# Patient Record
Sex: FEMALE | Race: WHITE | NOT HISPANIC OR LATINO | Employment: STUDENT | URBAN - METROPOLITAN AREA
[De-identification: names, ages, dates, MRNs, and addresses within clinical notes are randomized per-mention and may not be internally consistent; named-entity substitution may affect disease eponyms.]

---
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
## 2025-01-07 ENCOUNTER — HOSPITAL ENCOUNTER (EMERGENCY)
Facility: HOSPITAL | Age: 20
Discharge: HOME | End: 2025-01-07
Attending: EMERGENCY MEDICINE
Payer: MEDICAID

## 2025-01-07 VITALS
TEMPERATURE: 98 F | WEIGHT: 110 LBS | RESPIRATION RATE: 16 BRPM | OXYGEN SATURATION: 100 % | HEIGHT: 62 IN | BODY MASS INDEX: 20.24 KG/M2 | HEART RATE: 84 BPM | SYSTOLIC BLOOD PRESSURE: 100 MMHG | DIASTOLIC BLOOD PRESSURE: 70 MMHG

## 2025-01-07 DIAGNOSIS — N39.0 URINARY TRACT INFECTION WITHOUT HEMATURIA, SITE UNSPECIFIED: ICD-10-CM

## 2025-01-07 DIAGNOSIS — O20.9 VAGINAL BLEEDING AFFECTING EARLY PREGNANCY: Primary | ICD-10-CM

## 2025-01-07 DIAGNOSIS — O20.9 VAGINAL BLEEDING IN PREGNANCY, FIRST TRIMESTER: ICD-10-CM

## 2025-01-07 LAB
ALBUMIN SERPL-MCNC: 3.3 G/DL (ref 3.3–5.5)
ALP SERPL-CCNC: 41 U/L (ref 42–141)
B-HCG UR QL: POSITIVE
BILIRUB SERPL-MCNC: 0.8 MG/DL (ref 0.2–1.6)
BILIRUBIN, POC UA: NEGATIVE
BLOOD, POC UA: ABNORMAL
BUN SERPL-MCNC: 5 MG/DL (ref 7–22)
CALCIUM SERPL-MCNC: 9.5 MG/DL (ref 8–10.3)
CHLORIDE SERPL-SCNC: 108 MMOL/L (ref 98–108)
CLARITY, UA: ABNORMAL
COLOR, UA: YELLOW
CREAT SERPL-MCNC: 0.3 MG/DL (ref 0.6–1.2)
CTP QC/QA: YES
GLUCOSE SERPL-MCNC: 88 MG/DL (ref 73–118)
GLUCOSE, POC UA: NEGATIVE
HCG INTACT+B SERPL-ACNC: NORMAL MIU/ML
HCT, POC: NORMAL
HGB, POC: NORMAL (ref 14–18)
KETONES, POC UA: NEGATIVE
LEUKOCYTE EST, POC UA: ABNORMAL
MCH, POC: NORMAL
MCHC, POC: NORMAL
MCV, POC: NORMAL
MPV, POC: NORMAL
NITRITE, POC UA: NEGATIVE
PH UR STRIP: 7.5 [PH] (ref 5–8)
POC ALT (SGPT): 9 U/L (ref 10–47)
POC AST (SGOT): 17 U/L (ref 11–38)
POC PLATELET COUNT: NORMAL
POC TCO2: 25 MMOL/L (ref 18–33)
POTASSIUM BLD-SCNC: 4.2 MMOL/L (ref 3.6–5.1)
PROTEIN, POC UA: ABNORMAL
PROTEIN, POC: 6.9 G/DL (ref 6.4–8.1)
RBC, POC: NORMAL
RDW, POC: NORMAL
SODIUM BLD-SCNC: 144 MMOL/L (ref 128–145)
SPECIFIC GRAVITY, POC UA: 1.02 (ref 1–1.03)
UROBILINOGEN, POC UA: 0.2 E.U./DL
WBC, POC: NORMAL

## 2025-01-07 PROCEDURE — 81025 URINE PREGNANCY TEST: CPT | Mod: ER

## 2025-01-07 PROCEDURE — 25000003 PHARM REV CODE 250: Mod: ER | Performed by: EMERGENCY MEDICINE

## 2025-01-07 PROCEDURE — 96360 HYDRATION IV INFUSION INIT: CPT | Mod: ER

## 2025-01-07 PROCEDURE — 96372 THER/PROPH/DIAG INJ SC/IM: CPT | Performed by: EMERGENCY MEDICINE

## 2025-01-07 PROCEDURE — 80053 COMPREHEN METABOLIC PANEL: CPT | Mod: ER

## 2025-01-07 PROCEDURE — 99284 EMERGENCY DEPT VISIT MOD MDM: CPT | Mod: 25,ER

## 2025-01-07 PROCEDURE — 63600519 RHOGAM PHARM REV CODE 636 ALT 250 W HCPCS: Mod: ER | Performed by: EMERGENCY MEDICINE

## 2025-01-07 PROCEDURE — 85025 COMPLETE CBC W/AUTO DIFF WBC: CPT | Mod: ER

## 2025-01-07 PROCEDURE — 84702 CHORIONIC GONADOTROPIN TEST: CPT | Performed by: EMERGENCY MEDICINE

## 2025-01-07 RX ORDER — AMOXICILLIN 500 MG/1
500 CAPSULE ORAL 3 TIMES DAILY
Qty: 21 CAPSULE | Refills: 0 | Status: SHIPPED | OUTPATIENT
Start: 2025-01-07 | End: 2025-01-14

## 2025-01-07 RX ADMIN — SODIUM CHLORIDE 1000 ML: 9 INJECTION, SOLUTION INTRAVENOUS at 10:01

## 2025-01-07 RX ADMIN — HUMAN RHO(D) IMMUNE GLOBULIN 300 MCG: 300 INJECTION, SOLUTION INTRAMUSCULAR at 12:01

## 2025-01-07 NOTE — DISCHARGE INSTRUCTIONS
Take antibiotics for UTI. Do not insert anything in your vagina, including no sex until you are cleared by your OB doctor. Contact your doctor today about your ER visit and vaginal bleeding. If you have worsening symptoms, return to ER.

## 2025-01-07 NOTE — ED PROVIDER NOTES
"Encounter Date: 2025       History     Chief Complaint   Patient presents with    Vaginal Bleeding     . 11w pregnant. Noticed blood on toilet paper this am. Denies clots.      19-year-old female  at approximately 11 weeks gestation presents with vaginal bleeding that began this morning.  She states she had lower abdominal cramping a few days ago.  This morning when she woke she noted blood on the toilet paper.  The bleeding does remind her of a period.  No other complaints.  She does have established care with an OBGYN but has not contacted her doctor about this issue.  Per EMRs, she is O negative.      Review of patient's allergies indicates:  No Known Allergies  History reviewed. No pertinent past medical history.  Past Surgical History:   Procedure Laterality Date    COLONOSCOPY N/A 2020    Procedure: COLONOSCOPY;  Surgeon: Bhavesh Russo MD;  Location: [address];  Service: Endoscopy;  Laterality: N/A;     Family History   Problem Relation Name Age of Onset    GI problems Mother          IBS, 6 "colon polyps" as young child    GI problems Sister          diarrhea, abd pain     Social History     Tobacco Use    Smoking status: Never     Passive exposure: Never    Smokeless tobacco: Current   Substance Use Topics    Alcohol use: Yes     Comment: occ    Drug use: Never     Review of Systems   Constitutional:  Negative for activity change, appetite change, chills and fever.   HENT:  Negative for congestion, rhinorrhea, sneezing and sore throat.    Respiratory:  Negative for cough, choking, shortness of breath and wheezing.    Cardiovascular:  Negative for chest pain and palpitations.   Gastrointestinal:  Negative for abdominal pain, diarrhea, nausea and vomiting.   Genitourinary:  Positive for vaginal bleeding.   Neurological:  Negative for dizziness, syncope, light-headedness and headaches.   All other systems reviewed and are negative.      Physical Exam     Initial Vitals [25 " 0915]   BP Pulse Resp Temp SpO2   102/67 86 18 97.8 °F (36.6 °C) 100 %      MAP       --         Physical Exam    Nursing note and vitals reviewed.  Constitutional: She appears well-developed and well-nourished. No distress.   HENT:   Head: Normocephalic and atraumatic.   Eyes: Conjunctivae are normal.   Neck:   Normal range of motion.  Cardiovascular:  Normal rate, regular rhythm and normal heart sounds.           No murmur heard.  Pulmonary/Chest: Breath sounds normal. No respiratory distress.   Abdominal: Abdomen is soft. Bowel sounds are normal. She exhibits no distension. There is no abdominal tenderness. There is no rebound and no guarding.   Musculoskeletal:         General: No tenderness or edema. Normal range of motion.      Cervical back: Normal range of motion.     Neurological: She is alert and oriented to person, place, and time. GCS score is 15. GCS eye subscore is 4. GCS verbal subscore is 5. GCS motor subscore is 6.   Skin: Skin is warm and dry.   Psychiatric: She has a normal mood and affect. Her behavior is normal.         ED Course   Procedures  Labs Reviewed   POCT URINE PREGNANCY - Abnormal       Result Value    POC Preg Test, Ur Positive (*)      Acceptable Yes     POCT URINALYSIS W/O SCOPE - Abnormal    Glucose, UA Negative      Bilirubin, UA Negative      Ketones, UA Negative      Spec Grav UA 1.020      Blood, UA 3+ (*)     PH, UA 7.5      Protein, UA 1+ (*)     Urobilinogen, UA 0.2      Nitrite, UA Negative      Leukocytes, UA 1+ (*)     Color, UA POC Yellow      Clarity, UA, POC Cloudy     POCT CMP - Abnormal    Albumin, POC 3.3      Alkaline Phosphatase, POC 41 (*)     ALT (SGPT), POC 9 (*)     AST (SGOT), POC 17      POC BUN 5 (*)     Calcium, POC 9.5      POC Chloride 108      POC Creatinine 0.3 (*)     POC Glucose 88      POC Potassium 4.2      POC Sodium 144      Bilirubin, POC 0.8      POC TCO2 25      Protein, POC 6.9     HCG, QUANTITATIVE   POCT CBC    Hematocrit         Hemoglobin        RBC        WBC        MCV        MCH, POC        MCHC        RDW-CV        Platelet Count, POC        MPV       POCT URINALYSIS W/O SCOPE   POCT CMP   POCT URINALYSIS W/O SCOPE   PREPARE RH IMMUNE GLOBULIN          Imaging Results              US OB <14 Wks, TransAbd, Single Gestation (Final result)  Result time 25 11:10:53   Procedure changed from US OB <14 Wks TransAbd & TransVag, Single Gestation (XPD)     Final result by Herrera Silveira MD (25 11:10:53)                   Impression:      Single live intrauterine pregnancy with estimated gestational age 11 weeks 2 days and an SOCO of 2025.      Electronically signed by: Herrera Silveira  Date:    2025  Time:    11:10               Narrative:    EXAMINATION:  US OB <14 WEEKS TRANSABDOM, SINGLE GESTATION    CLINICAL HISTORY:  Vag Bleeding;    TECHNIQUE:  Transabdominal sonography of the pelvis was performed.    COMPARISON:  None.    FINDINGS:  Intrauterine gestation(s): Single    Mean gestational sac diameter: 5.0 cm    Yolk sac: 0.4 cm    Crown-rump length (CRL): 5.1 cm    Cardiac activity: 167 bpm    Subchorionic hemorrhage: None.    Right ovary: Normal.    Left ovary: Normal.    Miscellaneous: No Free Fluid.  The cervix is closed and measures 4.2 cm in length.                                       Medications   rho(D) immune globulin (RhoGAM/HyperRHO) IM injection (has no administration in time range)   sodium chloride 0.9% bolus 1,000 mL 1,000 mL (1,000 mLs Intravenous New Bag 25 1033)     Medical Decision Making  19-year-old female  at approximately 11 weeks gestation presents with vaginal bleeding that began this morning.  She states she had lower abdominal cramping a few days ago.  This morning when she woke she noted blood on the toilet paper.  The bleeding does remind her of a period.  No other complaints.  She does have established care with an OBGYN but has not contacted her doctor about this issue.  Per  EMRs, she is O negative.    On exam, there is no abdominal tenderness.  In shared decision making with the patient, we will check CBC, CMP, UPT, beta hCG, urinalysis, and ultrasound. Work up shows UTI and a SLIUP at approximately 11 weeks gestation. H&H is stable. She was given rhogam. Will treat UTI with amoxil. Pt advised to contact her OB today about her bleeding and ER visit.     Amount and/or Complexity of Data Reviewed  Labs: ordered.  Radiology: ordered.    Risk  Prescription drug management.                                      Clinical Impression:  Final diagnoses:  [O20.9] Vaginal bleeding affecting early pregnancy (Primary)  [O20.9] Vaginal bleeding in pregnancy, first trimester  [N39.0] Urinary tract infection without hematuria, site unspecified                     Eloina Lira MD  01/07/25 [id number]